# Patient Record
(demographics unavailable — no encounter records)

---

## 2024-10-31 NOTE — DISCUSSION/SUMMARY
[FreeTextEntry1] : 47 YEAR OLD FEMALE LMP 10/23/2024 WITH CYCLES MONTHLY Q 28 X 5, 1ST 3 DAYS VERY HEAVY, BUT UNCHANGED IN PATTERN, PRESENTS FOR WELL WOMAN VISIT AND PAP. CYCLES PAINFUL AT TIMES AND PT TAKES ALLEVE AS NEEDED. NO NEW MEDICAL OR SURGICAL HISTORY SINCE LAST VISIT- PCP DID CHANGE PT FROM ATENOLOL TO METOPROLOL FOR HYPERTENSION. MEDICATIONS; METOPROLOL;  ATORVASTATIN 10 MG MEDICATION ALLERGIES; NONE ROS;BMS-=NORMAL; NO FAM HISTORY OF COLON CANCER; NO BASELINE COLONOSCOPY YET         NO URINARY COMPLAINTS         SEXUALLY ACTIVE WTHOUT COMPLAINTS; CONDOMS MOST OF THE TIME BREASTS; DOES BREAST SELF EXAMINATION                    NO FAM HISTORY OF BREAST CANCER                    LAST MAMMOGRAPHY DONE 10/2/2023 BIRADS II +EXERCISE; + MULTIVITAMINS; + DAIRY;/YOGURT; NO TOBACCO OR VAPING  PE;'/70; TEMP 97.2; WEIGHT 142 LBS HEIGHT 5'3"      BREASTS; NO MASSES OR DISCHARGES      ABDOMEN;SOFT, NO MASSES; NO TENDERNESS TO PALPATION; LOW TRANSVERSE SCAR      PELVIC NORMAL EXTERNAL GENITALIA                    NORMAL URETHRAL MEATUS                    NORMAL VAGINA WITHOUT LESION                    NORMAL CERVIX WITHOUT LESION                    3RD DEGREE RETROVERTED NORMAL UTERUS ON BIMANUAL EXAMINATION                    NO PALPABLE ADNEXAL MASSES  IMP; WELL WOMAN          HEAVY PERIODS          DYSMENORRHEA           HYPERTENSION  PLAN; THIN PREP PAP WITH HR HPV TESTING DONE-PT TOLD TO CALL IN 2 WKS FOR RESULTS             BREAST SELF EXAMIANTION MONTHLY CONTINUED TO BE STRONGLY ADVISED             SCREEING MAMMOGRPAHY AND BILATERAL BREAST US ( PT ALWAYS GETS CALLBACK)                   ORDERED-SCRIPT GIVEN             COLONOSCOPY STRONLGY ADVISED AND REFERRAL SCRIPT GIVEN             RETURN TO OFFICE ONE YEAR OR PRN